# Patient Record
Sex: FEMALE | Race: WHITE | Employment: FULL TIME | URBAN - METROPOLITAN AREA
[De-identification: names, ages, dates, MRNs, and addresses within clinical notes are randomized per-mention and may not be internally consistent; named-entity substitution may affect disease eponyms.]

---

## 2019-05-09 ENCOUNTER — OFFICE VISIT (OUTPATIENT)
Dept: OBGYN CLINIC | Age: 59
End: 2019-05-09

## 2019-05-09 DIAGNOSIS — Z01.419 ENCOUNTER FOR GYNECOLOGICAL EXAMINATION WITHOUT ABNORMAL FINDING: Primary | ICD-10-CM

## 2019-05-09 NOTE — PROGRESS NOTES
Annual exam ages 40-58    [address] [place] [address] Naldo is a ,  62 y.o. female [facility] No LMP recorded. Patient is postmenopausal.    She presents for her annual checkup. She is having no significant problems. Sister  post L/S saima from atrial fib and clot. With regard to the Gardasil vaccine, she is older than the age for which it is FDA approved. Menstrual status:    Her periods are nonexistent in flow due to menopause. She denies dysmenorrhea. She reports no premenstrual symptoms. Contraception:    The current method of family planning is post menopausal status. Sexual history:    She  reports that she does not currently engage in sexual activity. Medical conditions:    Since her last annual GYN exam about two years ago, she has not the following changes in her health history: none. Pap and Mammogram History:    Her most recent Pap smear was normal, obtained 2 year(s) ago. The patient has not had a recent mammogram.    Breast Cancer History/Substance Abuse: negative    Osteoporosis History:    Family history does not include a first or second degree relative with osteopenia or osteoporosis. A bone density scan has not been obtained     Past Medical History:   Diagnosis Date    Dysmenorrhea     Fibrocystic breast     Graves disease     Hypercholesterolemia     Hypertension      Past Surgical History:   Procedure Laterality Date    HX OTHER SURGICAL      Eye Surgery    HX TONSILLECTOMY         Current Outpatient Medications   Medication Sig Dispense Refill    FOLBIC 2.5-25-2 mg tablet   11    DOCOSAHEXANOIC ACID/EPA (FISH OIL PO) Take  by mouth.  BIOTIN PO Take  by mouth.  cholecalciferol, vitamin D3, (VITAMIN D3) 2,000 unit tab Take  by mouth.  lutein 6 mg cap Take  by mouth.  ROSUVASTATIN CALCIUM (CRESTOR PO) Take  by mouth.  estradiol-norethindrone (COMBIPATCH) 0.05-0.14 mg/24 hr 1 Patch by TransDERmal route two (2) times a week.  24 Patch 3    estradiol (MINIVELLE) 0.075 mg/24 hr 1 Patch by TransDERmal route every Monday.  medroxyPROGESTERone (PROVERA) 10 mg tablet Take  by mouth daily.  LOSARTAN-HYDROCHLOROTHIAZIDE PO Take  by mouth.  OM-3/DHA/EPA/FISH OIL/VIT D3 (OMEGA-3 + VITAMIN D3 PO) Take  by mouth.  levothyroxine (SYNTHROID) 112 mcg tablet Take  by mouth Daily (before breakfast).  CYANOCOBALAMIN, VITAMIN B-12, (VITAMIN B-12 PO) Take  by mouth.  PYRIDOXINE HCL (VITAMIN B-6 PO) Take  by mouth. Allergies: Ciprofloxacin; Macrobid [nitrofurantoin monohyd/m-cryst]; and Penicillins     Tobacco History:  reports that she has been smoking. She has never used smokeless tobacco.  Alcohol Abuse:  has no alcohol history on file. Drug Abuse:  has no drug history on file. Family Medical/Cancer History:   Family History   Problem Relation Age of Onset    Heart Disease Mother     Celiac Disease Father         Review of Systems - History obtained from the patient  Constitutional: negative for weight loss, fever, night sweats  HEENT: negative for hearing loss, earache, congestion, snoring, sorethroat  CV: negative for chest pain, palpitations, edema  Resp: negative for cough, shortness of breath, wheezing  GI: negative for change in bowel habits, abdominal pain, black or bloody stools  : negative for frequency, dysuria, hematuria, vaginal discharge  MSK: negative for back pain, joint pain, muscle pain  Breast: negative for breast lumps, nipple discharge, galactorrhea  Skin :negative for itching, rash, hives  Neuro: negative for dizziness, headache, confusion, weakness  Psych: negative for anxiety, depression, change in mood  Heme/lymph: negative for bleeding, bruising, pallor    Physical Exam    There were no vitals taken for this visit.     Constitutional  · Appearance: well-nourished, well developed, alert, in no acute distress    HENT  · Head and Face: appears normal    Neck  · Inspection/Palpation: normal appearance, no masses or tenderness  · Lymph Nodes: no lymphadenopathy present  · Thyroid: gland size normal, nontender, no nodules or masses present on palpation    Chest  · Respiratory Effort: breathing unlabored  · Auscultation: normal breath sounds    Cardiovascular  · Heart:  · Auscultation: regular rate and rhythm without murmur    Breasts  · Inspection of Breasts: breasts symmetrical, no skin changes, no discharge present, nipple appearance normal, no skin retraction present  · Palpation of Breasts and Axillae: no masses present on palpation, no breast tenderness  · Axillary Lymph Nodes: no lymphadenopathy present    Gastrointestinal  · Abdominal Examination: abdomen non-tender to palpation, normal bowel sounds, no masses present  · Liver and spleen: no hepatomegaly present, spleen not palpable  · Hernias: no hernias identified    Genitourinary  · External Genitalia: normal appearance for age, no discharge present, no tenderness present, no inflammatory lesions present, no masses present, no atrophy present  · Vagina: normal vaginal vault without central or paravaginal defects, no discharge present, no inflammatory lesions present, no masses present  · Bladder: non-tender to palpation  · Urethra: appears normal  · Cervix: normal   · Uterus: normal size, shape and consistency  · Adnexa: no adnexal tenderness present, no adnexal masses present  · Perineum: perineum within normal limits, no evidence of trauma, no rashes or skin lesions present  · Anus: anus within normal limits, no hemorrhoids present  · Inguinal Lymph Nodes: no lymphadenopathy present    Skin  · General Inspection: no rash, no lesions identified    Neurologic/Psychiatric  · Mental Status:  · Orientation: grossly oriented to person, place and time  · Mood and Affect: mood normal, affect appropriate    Assessment:  Routine gynecologic examination  Her current medical status is satisfactory with no evidence of significant gynecologic issues.     Plan:  Counseled re: diet, exercise, healthy lifestyle  Return for yearly wellness visits  Rec annual mammogram

## 2019-05-09 NOTE — PATIENT INSTRUCTIONS
Well Visit, Women 48 to 72: Care Instructions  Your Care Instructions    Physical exams can help you stay healthy. Your doctor has checked your overall health and may have suggested ways to take good care of yourself. He or she also may have recommended tests. At home, you can help prevent illness with healthy eating, regular exercise, and other steps. Follow-up care is a key part of your treatment and safety. Be sure to make and go to all appointments, and call your doctor if you are having problems. It's also a good idea to know your test results and keep a list of the medicines you take. How can you care for yourself at home? · Reach and stay at a healthy weight. This will lower your risk for many problems, such as obesity, diabetes, heart disease, and high blood pressure. · Get at least 30 minutes of exercise on most days of the week. Walking is a good choice. You also may want to do other activities, such as running, swimming, cycling, or playing tennis or team sports. · Do not smoke. Smoking can make health problems worse. If you need help quitting, talk to your doctor about stop-smoking programs and medicines. These can increase your chances of quitting for good. · Protect your skin from too much sun. When you're outdoors from 10 a.m. to 4 p.m., stay in the shade or cover up with clothing and a hat with a wide brim. Wear sunglasses that block UV rays. Even when it's cloudy, put broad-spectrum sunscreen (SPF 30 or higher) on any exposed skin. · See a dentist one or two times a year for checkups and to have your teeth cleaned. · Wear a seat belt in the car. · Limit alcohol to 1 drink a day. Too much alcohol can cause health problems. Follow your doctor's advice about when to have certain tests. These tests can spot problems early. · Cholesterol.  Your doctor will tell you how often to have this done based on your age, family history, or other things that can increase your risk for heart attack and stroke. · Blood pressure. Have your blood pressure checked during a routine doctor visit. Your doctor will tell you how often to check your blood pressure based on your age, your blood pressure results, and other factors. · Mammogram. Ask your doctor how often you should have a mammogram, which is an X-ray of your breasts. A mammogram can spot breast cancer before it can be felt and when it is easiest to treat. · Pap test and pelvic exam. Ask your doctor how often you should have a Pap test. You may not need to have a Pap test as often as you used to. · Vision. Have your eyes checked every year or two or as often as your doctor suggests. Some experts recommend that you have yearly exams for glaucoma and other age-related eye problems starting at age 48. · Hearing. Tell your doctor if you notice any change in your hearing. You can have tests to find out how well you hear. · Diabetes. Ask your doctor whether you should have tests for diabetes. · Colon cancer. You should begin tests for colon cancer at age 48. You may have one of several tests. Your doctor will tell you how often to have tests based on your age and risk. Risks include whether you already had a precancerous polyp removed from your colon or whether your parents, sisters and brothers, or children have had colon cancer. · Thyroid disease. Talk to your doctor about whether to have your thyroid checked as part of a regular physical exam. Women have an increased chance of a thyroid problem. · Osteoporosis. You should begin tests for bone density at age 72. If you are younger than 72, ask your doctor whether you have factors that may increase your risk for this disease. You may want to have this test before age 72. · Heart attack and stroke risk. At least every 4 to 6 years, you should have your risk for heart attack and stroke assessed.  Your doctor uses factors such as your age, blood pressure, cholesterol, and whether you smoke or have diabetes to show what your risk for a heart attack or stroke is over the next 10 years. When should you call for help? Watch closely for changes in your health, and be sure to contact your doctor if you have any problems or symptoms that concern you. Where can you learn more? Go to http://jason-deanna.info/. Enter J182 in the search box to learn more about \"Well Visit, Women 50 to 72: Care Instructions. \"  Current as of: March 28, 2018  Content Version: 11.9  © 3782-8667 [employer]. Care instructions adapted under license by [employer] (which disclaims liability or warranty for this information). If you have questions about a medical condition or this instruction, always ask your healthcare professional. [address] any warranty or liability for your use of this information.

## 2022-05-26 NOTE — PROGRESS NOTES
Annual exam ages 40-58      [facility] Naldo is a [de-identified] P5,  64 y.o. female   No LMP recorded. Patient is postmenopausal.    She presents for her annual checkup. Swimming and Pilates for exercise. Has met someone that she would like to have sex with. She is having no significant problems. With regard to the Gardasil vaccine, she is older than the FDA approved age to receive it. Menstrual status:    Her periods are absent in flow. Contraception:    The current method of family planning is NA post menopause. Hormonal status:  She reports no perimenstrual type symptoms. She is not having vasomotor symptoms. The patient is not using any ERT. Sexual history:    She  reports previously being sexually active. Medical conditions:    Since her last annual GYN exam about three years ago, she has not the following changes in her health history: none. Surgical history confirmed with patient. has a past surgical history that includes hx tonsillectomy and hx other surgical.    Pap and Mammogram History:    Her most recent Pap smear was normal, obtained 5 year(s) ago. The patient has not had a recent mammogram.    Breast Cancer History/Substance Abuse: negative      Osteoporosis History:    Family history does not include a first or second degree relative with osteopenia or osteoporosis. Past Medical History:   Diagnosis Date    Dysmenorrhea     Fibrocystic breast     Graves disease     Hypercholesterolemia     Hypertension      Past Surgical History:   Procedure Laterality Date    HX OTHER SURGICAL      Eye Surgery    HX TONSILLECTOMY         Current Outpatient Medications   Medication Sig Dispense Refill    FOLBIC 2.5-25-2 mg tablet   11    DOCOSAHEXANOIC ACID/EPA (FISH OIL PO) Take  by mouth.  BIOTIN PO Take  by mouth.  cholecalciferol, vitamin D3, (VITAMIN D3) 2,000 unit tab Take  by mouth.  lutein 6 mg cap Take  by mouth.       ROSUVASTATIN CALCIUM (CRESTOR PO) Take by mouth.  LOSARTAN-HYDROCHLOROTHIAZIDE PO Take  by mouth.  OM-3/DHA/EPA/FISH OIL/VIT D3 (OMEGA-3 + VITAMIN D3 PO) Take  by mouth.  levothyroxine (SYNTHROID) 112 mcg tablet Take  by mouth Daily (before breakfast).  CYANOCOBALAMIN, VITAMIN B-12, (VITAMIN B-12 PO) Take  by mouth.  PYRIDOXINE HCL (VITAMIN B-6 PO) Take  by mouth.  estradiol-norethindrone (COMBIPATCH) 0.05-0.14 mg/24 hr 1 Patch by TransDERmal route two (2) times a week. (Patient not taking: Reported on 5/27/2022) 24 Patch 3    estradiol (MINIVELLE) 0.075 mg/24 hr 1 Patch by TransDERmal route every Monday. (Patient not taking: Reported on 5/27/2022)      medroxyPROGESTERone (PROVERA) 10 mg tablet Take  by mouth daily. (Patient not taking: Reported on 5/27/2022)       Allergies: Ciprofloxacin, Macrobid [nitrofurantoin monohyd/m-cryst], and Penicillins     Tobacco History:  reports that she has been smoking. She has never used smokeless tobacco.  Alcohol Abuse:  has no history on file for alcohol use. Drug Abuse:  has no history on file for drug use.     Family Medical/Cancer History:   Family History   Problem Relation Age of Onset    Heart Disease Mother     Celiac Disease Father         Review of Systems - History obtained from the patient  Constitutional: negative for weight loss, fever, night sweats  HEENT: negative for hearing loss, earache, congestion, snoring, sorethroat  CV: negative for chest pain, palpitations, edema  Resp: negative for cough, shortness of breath, wheezing  GI: negative for change in bowel habits, abdominal pain, black or bloody stools  : negative for frequency, dysuria, hematuria, vaginal discharge  MSK: negative for back pain, joint pain, muscle pain  Breast: negative for breast lumps, nipple discharge, galactorrhea  Skin :negative for itching, rash, hives  Neuro: negative for dizziness, headache, confusion, weakness  Psych: negative for anxiety, depression, change in mood  Heme/lymph: negative for bleeding, bruising, pallor    Physical Exam    Visit Vitals  /72   Wt 164 lb 3.2 oz (74.5 kg)   BMI 25.72 kg/m²       Constitutional  · Appearance: well-nourished, well developed, alert, in no acute distress    HENT  · Head and Face: appears normal    Neck  · Inspection/Palpation: normal appearance, no masses or tenderness  · Lymph Nodes: no lymphadenopathy present  · Thyroid: gland size normal, nontender, no nodules or masses present on palpation    Chest  · Respiratory Effort: breathing unlabored  · Auscultation: normal breath sounds    Cardiovascular  · Heart:  · Auscultation: regular rate and rhythm without murmur    Breasts  · Inspection of Breasts: breasts symmetrical, no skin changes, no discharge present, nipple appearance normal, no skin retraction present  · Palpation of Breasts and Axillae: no masses present on palpation, no breast tenderness  · Axillary Lymph Nodes: no lymphadenopathy present    Gastrointestinal  · Abdominal Examination: abdomen non-tender to palpation, normal bowel sounds, no masses present  · Liver and spleen: no hepatomegaly present, spleen not palpable  · Hernias: no hernias identified    Genitourinary  · External Genitalia: normal appearance for age, no discharge present, no tenderness present, no inflammatory lesions present, no masses present, no atrophy present  · Vagina: normal vaginal vault without central or paravaginal defects, no discharge present, no inflammatory lesions present, no masses present  · Bladder: non-tender to palpation  · Urethra: appears normal  · Cervix: normal   · Uterus: normal size, shape and consistency  · Adnexa: no adnexal tenderness present, no adnexal masses present  · Perineum: perineum within normal limits, no evidence of trauma, no rashes or skin lesions present  · Anus: anus within normal limits, no hemorrhoids present  · Inguinal Lymph Nodes: no lymphadenopathy present    Skin  · General Inspection: no rash, no lesions identified    Neurologic/Psychiatric  · Mental Status:  · Orientation: grossly oriented to person, place and time  · Mood and Affect: mood normal, affect appropriate    Assessment:  Routine gynecologic examination  Her current medical status is satisfactory with no evidence of significant gynecologic issues. Discussed lubrication and re-starting IC.     Plan:  Counseled re: diet, exercise, healthy lifestyle  Return for yearly wellness visits  Rec annual mammogram

## 2022-05-27 ENCOUNTER — OFFICE VISIT (OUTPATIENT)
Dept: OBGYN CLINIC | Age: 62
End: 2022-05-27
Payer: COMMERCIAL

## 2022-05-27 VITALS — WEIGHT: 164.2 LBS | BODY MASS INDEX: 25.72 KG/M2 | SYSTOLIC BLOOD PRESSURE: 128 MMHG | DIASTOLIC BLOOD PRESSURE: 72 MMHG

## 2022-05-27 DIAGNOSIS — Z12.31 ENCOUNTER FOR SCREENING MAMMOGRAM FOR BREAST CANCER: Primary | ICD-10-CM

## 2022-05-27 PROCEDURE — 99396 PREV VISIT EST AGE 40-64: CPT | Performed by: OBSTETRICS & GYNECOLOGY

## 2023-03-28 ENCOUNTER — TELEPHONE (OUTPATIENT)
Dept: OBGYN CLINIC | Age: 63
End: 2023-03-28

## 2023-03-28 DIAGNOSIS — Z12.39 SCREENING BREAST EXAMINATION: Primary | ICD-10-CM

## 2023-03-28 NOTE — TELEPHONE ENCOUNTER
Two patient identifiers used    58year old patient last seen in the office on 5/27/2022 for ae     Patient calling to get order for screening mammogram to be faxed to [facility]    Order placed as per MD order and faxed to [facility] at [phone] with confirmation received    Patient wondering who MD would refer for colonoscopy  Please advise    Thank you

## 2023-03-29 NOTE — TELEPHONE ENCOUNTER
Patient advised of MD recommendations and was reschedule per patient request for 6/1/2023 at 1:00 Pm for ae      Patient verbalized understanding.

## 2023-05-17 RX ORDER — MEDROXYPROGESTERONE ACETATE 10 MG/1
TABLET ORAL DAILY
COMMUNITY
End: 2023-06-01

## 2023-05-17 RX ORDER — HYDROCODONE/ACETAMINOPHEN 5 MG-500MG
TABLET ORAL
COMMUNITY
End: 2023-06-01

## 2023-05-17 RX ORDER — ESTRADIOL 0.07 MG/D
1 FILM, EXTENDED RELEASE TRANSDERMAL
COMMUNITY
End: 2023-06-01

## 2023-05-17 RX ORDER — LEVOTHYROXINE SODIUM 112 UG/1
TABLET ORAL
COMMUNITY
End: 2023-06-01

## 2023-05-17 RX ORDER — FOLIC ACID-PYRIDOXINE-CYANOCOBALAMIN TAB 2.5-25-2 MG 2.5-25-2 MG
TAB ORAL
COMMUNITY
Start: 2015-09-07

## 2023-06-01 ENCOUNTER — OFFICE VISIT (OUTPATIENT)
Age: 63
End: 2023-06-01
Payer: COMMERCIAL

## 2023-06-01 VITALS — SYSTOLIC BLOOD PRESSURE: 145 MMHG | WEIGHT: 180.2 LBS | DIASTOLIC BLOOD PRESSURE: 76 MMHG

## 2023-06-01 DIAGNOSIS — Z01.419 ENCOUNTER FOR ANNUAL ROUTINE GYNECOLOGICAL EXAMINATION: Primary | ICD-10-CM

## 2023-06-01 DIAGNOSIS — Z12.4 ENCOUNTER FOR PAPANICOLAOU SMEAR FOR CERVICAL CANCER SCREENING: ICD-10-CM

## 2023-06-01 PROCEDURE — 99396 PREV VISIT EST AGE 40-64: CPT | Performed by: OBSTETRICS & GYNECOLOGY

## 2023-06-01 RX ORDER — LOSARTAN POTASSIUM 100 MG/1
TABLET ORAL
COMMUNITY
Start: 2023-05-27

## 2023-06-01 RX ORDER — ROSUVASTATIN CALCIUM 5 MG/1
2.5 TABLET, COATED ORAL
COMMUNITY

## 2023-06-01 RX ORDER — LEVOTHYROXINE SODIUM 112 UG/1
112 TABLET ORAL DAILY
COMMUNITY

## 2023-06-01 NOTE — PROGRESS NOTES
Luis A Sierra is a 58 y.o. female returns for an annual exam     Chief Complaint   Patient presents with    Annual Exam       No LMP recorded. (Menstrual status: Menopause). Her periods are absent in flow   Problems: no problems  Birth Control: post menopausal status. Last Pap: normal obtained 7 year(s) ago. She does not have a history of MACRINA 2, 3 or cervical cancer. Last Mammogram: done 4/4/23          1. Have you been to the ER, urgent care clinic, or hospitalized since your last visit? No    2. Have you seen or consulted any other health care providers outside of the [address] [place] [address] since your last visit? No    Examination chaperoned by Eleazar Goss LPN.

## 2023-06-01 NOTE — PROGRESS NOTES
Annual exam post menopause      Peter Taylor is a No obstetric history on file. ,  58 y.o. female   No LMP recorded. (Menstrual status: Menopause). She presents for her annual checkup. She is having no problems. Menstrual status: The patient is not having menses. Hormonal status:  She reports no perimenstrual type symptoms. She is not having vasomotor symptoms. The patient is not using any HRT. Did not start it. Sexual history:    She  has no history on file for sexual activity. Per Nursing Note:  Last Pap: normal obtained 7 year(s) ago. She does not have a history of MACRINA 2, 3 or cervical cancer.       Last Mammogram: done 4/4/23    Past Medical History:   Diagnosis Date    Dysmenorrhea     Fibrocystic breast     Graves disease     Hypercholesterolemia     Hypertension      Past Surgical History:   Procedure Laterality Date    OTHER SURGICAL HISTORY      Eye Surgery    TONSILLECTOMY         Current Outpatient Medications   Medication Sig Dispense Refill    levothyroxine (SYNTHROID) 112 MCG tablet Take 1 tablet by mouth Daily      rosuvastatin (CRESTOR) 5 MG tablet 0.5 tablets      losartan (COZAAR) 100 MG tablet       BIOTIN PO Take by mouth      Cholecalciferol 50 MCG (2000 UT) TABS Take by mouth      estradiol-norethindrone (COMBIPATCH) 0.05-0.14 MG/DAY Place 1 patch onto the skin Twice a Week      folic acid-pyridoxine-cyancobalamin 2.5-25-2 mg tablet (FOLBIC) 2.5-25-2 MG TABS ceived the following from [facility]: Outside name: FOLBIC 2.5-25-2 mg tablet      estradiol (VIVELLE) 0.075 MG/24HR Place 1 patch onto the skin (Patient not taking: Reported on 6/1/2023)      levothyroxine (SYNTHROID) 112 MCG tablet Take by mouth every morning (before breakfast) (Patient not taking: Reported on 6/1/2023)      Lutein 6 MG CAPS Take by mouth (Patient not taking: Reported on 6/1/2023)      medroxyPROGESTERone (PROVERA) 10 MG tablet Take by mouth daily (Patient not taking: Reported on

## 2023-06-08 LAB
CYTOLOGIST CVX/VAG CYTO: NORMAL
CYTOLOGY CVX/VAG DOC CYTO: NORMAL
CYTOLOGY CVX/VAG DOC THIN PREP: NORMAL
DX ICD CODE: NORMAL
HPV GENOTYPE REFLEX: NORMAL
HPV I/H RISK 4 DNA CVX QL PROBE+SIG AMP: NEGATIVE
Lab: NORMAL
OTHER STN SPEC: NORMAL
STAT OF ADQ CVX/VAG CYTO-IMP: NORMAL

## 2023-06-23 ENCOUNTER — TELEPHONE (OUTPATIENT)
Age: 63
End: 2023-06-23

## 2023-06-23 NOTE — TELEPHONE ENCOUNTER
Two patient identifiers used    58year old patient last seen in the office on 6/1/2023 and is calling to check on her recent pap smear results    Patient advised of MD reviewed lab results and recommendations    Patient was provided the my chart help desk phone number to set up her my chart    Patient verbalized understanding.

## 2023-11-08 ENCOUNTER — TELEPHONE (OUTPATIENT)
Age: 63
End: 2023-11-08

## 2023-11-08 ENCOUNTER — OFFICE VISIT (OUTPATIENT)
Age: 63
End: 2023-11-08
Payer: COMMERCIAL

## 2023-11-08 VITALS — WEIGHT: 181.4 LBS | SYSTOLIC BLOOD PRESSURE: 169 MMHG | DIASTOLIC BLOOD PRESSURE: 84 MMHG

## 2023-11-08 DIAGNOSIS — N90.4 LICHEN SCLEROSUS OF VULVA: Primary | ICD-10-CM

## 2023-11-08 PROCEDURE — 99213 OFFICE O/P EST LOW 20 MIN: CPT | Performed by: OBSTETRICS & GYNECOLOGY

## 2023-11-08 NOTE — TELEPHONE ENCOUNTER
Two patient identifiers used    61year old patient last seen in the office on 6/1/2023 for ae    Patient calling to complain about vaginal itching for a few days , no discharge and has not tried anything over the counter      Patient was placed on the schedule to be seen to day as per her request at 1:20PM    Patient was advised that she will be evaluated for the vaginal itching at this visit    Patient verbalized understanding    AS aware of added on appointment

## 2023-11-08 NOTE — PROGRESS NOTES
Vulvar lesion evaluation    Nik Guzmán is a 61 y.o. female  No LMP recorded. (Menstrual status: Menopause). who presents with vulvar irritation. She noticed it several months ago. The sensation has not changed. No discharge or recent antibiotics. She reports the following associated symptoms: hemorrhoid itching. She denies the following associated symptoms: redness, drainage, swelling, irritation. Aggravating factors: none. Alleviating factors: none. Past Medical History:   Diagnosis Date    Dysmenorrhea     Fibrocystic breast     Graves disease     Hypercholesterolemia     Hypertension      Past Surgical History:   Procedure Laterality Date    OTHER SURGICAL HISTORY      Eye Surgery    TONSILLECTOMY       Social History     Occupational History    Not on file   Tobacco Use    Smoking status: Some Days    Smokeless tobacco: Never   Substance and Sexual Activity    Alcohol use: Not on file    Drug use: Not on file    Sexual activity: Not on file     Family History   Problem Relation Age of Onset    Celiac Disease Father     Heart Disease Mother        Allergies   Allergen Reactions    Ciprofloxacin      Other reaction(s): Unknown (comments)    Nitrofurantoin      Other reaction(s): Unknown (comments)    Penicillins      Other reaction(s): Unknown (comments)     Prior to Admission medications    Medication Sig Start Date End Date Taking? Authorizing Provider   betamethasone valerate (VALISONE) 0.1 % cream Apply topically 2 times daily.  11/8/23 11/15/23 Yes Alethea Coates MD   levothyroxine (SYNTHROID) 112 MCG tablet Take 1 tablet by mouth Daily    ProviderAnna MD   rosuvastatin (CRESTOR) 5 MG tablet 0.5 tablets    Anna Munoz MD   losartan (COZAAR) 100 MG tablet  5/27/23   ProviderAnna MD   BIOTIN PO Take by mouth    Automatic Reconciliation, Ar   Cholecalciferol 50 MCG (2000 UT) TABS Take by mouth    Automatic Reconciliation, Ar   folic acid-pyridoxine-cyancobalamin

## 2023-11-08 NOTE — PROGRESS NOTES
Fan Jimenez is a 61 y.o. female presents for a problem visit. Chief Complaint   Patient presents with    Vaginal Itching     No LMP recorded. (Menstrual status: Menopause). Birth Control: none. Last Pap: normal obtained 5 months  ago. The patient is reporting having:  vaginal itching   for 3 days. She reports the symptoms are is unchanged. Aggravating factors include none. And alleviating factors include none. 1. Have you been to the ER, urgent care clinic, or hospitalized since your last visit? No    2. Have you seen or consulted any other health care providers outside of the [address] [place] [address] Avenue since your last visit? No    Examination chaperoned by Venkat Pearson LPN.

## 2025-05-08 NOTE — PROGRESS NOTES
Ellen Do is a 64 y.o. female returns for an annual exam     No LMP recorded. (Menstrual status: Menopause).  Problems: no problems  Birth Control: post menopausal status.  Last Pap: normal obtained 2 year(s) ago.  She does not have a history of MACRINA 2, 3 or cervical cancer.   Last Mammogram: has not had a recent mammogram.  It was normal in 2023.   Last Bone Density: Never   Last colonoscopy: 2024 WNL      1. Have you been to the ER, urgent care clinic, or hospitalized since your last visit? No    2. Have you seen or consulted any other health care providers outside of the [facility] since your last visit? No    Examination chaperoned by Moustapha Wu LPN.

## 2025-05-13 ENCOUNTER — OFFICE VISIT (OUTPATIENT)
Age: 65
End: 2025-05-13
Payer: COMMERCIAL

## 2025-05-13 VITALS
DIASTOLIC BLOOD PRESSURE: 79 MMHG | OXYGEN SATURATION: 97 % | HEIGHT: 67 IN | SYSTOLIC BLOOD PRESSURE: 171 MMHG | BODY MASS INDEX: 28.28 KG/M2 | HEART RATE: 67 BPM | WEIGHT: 180.2 LBS

## 2025-05-13 DIAGNOSIS — Z01.419 ENCOUNTER FOR ANNUAL ROUTINE GYNECOLOGICAL EXAMINATION: Primary | ICD-10-CM

## 2025-05-13 DIAGNOSIS — N90.4 LICHEN SCLEROSUS OF VULVA: ICD-10-CM

## 2025-05-13 PROCEDURE — 99396 PREV VISIT EST AGE 40-64: CPT | Performed by: OBSTETRICS & GYNECOLOGY

## 2025-05-13 RX ORDER — BETAMETHASONE VALERATE 1.2 MG/G
CREAM TOPICAL
Qty: 45 G | Refills: 4 | Status: SHIPPED | OUTPATIENT
Start: 2025-05-13 | End: 2025-05-20

## 2025-05-13 SDOH — ECONOMIC STABILITY: FOOD INSECURITY: WITHIN THE PAST 12 MONTHS, YOU WORRIED THAT YOUR FOOD WOULD RUN OUT BEFORE YOU GOT MONEY TO BUY MORE.: NEVER TRUE

## 2025-05-13 SDOH — ECONOMIC STABILITY: FOOD INSECURITY: WITHIN THE PAST 12 MONTHS, THE FOOD YOU BOUGHT JUST DIDN'T LAST AND YOU DIDN'T HAVE MONEY TO GET MORE.: NEVER TRUE

## 2025-05-13 ASSESSMENT — PATIENT HEALTH QUESTIONNAIRE - PHQ9
SUM OF ALL RESPONSES TO PHQ QUESTIONS 1-9: 0
1. LITTLE INTEREST OR PLEASURE IN DOING THINGS: NOT AT ALL
2. FEELING DOWN, DEPRESSED OR HOPELESS: NOT AT ALL
SUM OF ALL RESPONSES TO PHQ QUESTIONS 1-9: 0

## 2025-05-13 NOTE — PROGRESS NOTES
Annual exam post menopause      Ellen Do is a No obstetric history on file.,  64 y.o. female   No LMP recorded. (Menstrual status: Menopause).    She presents for her annual checkup.     She is having no problems.    Menstrual status:  The patient is not having menses.    Hormonal status:  She reports no perimenstrual type symptoms.   She is not having vasomotor symptoms.  The patient is not using any HRT.     Sexual history:    She  has no history on file for sexual activity.    Per Nursing Note:  Last Pap: normal obtained 2 year(s) ago.  She does not have a history of MACRINA 2, 3 or cervical cancer.   Last Mammogram: has not had a recent mammogram.  It was normal in 2023.   Last Bone Density: Never   Last colonoscopy: 2024 WNL    Past Medical History:   Diagnosis Date    Dysmenorrhea     Fibrocystic breast     Graves disease     Hypercholesterolemia     Hypertension      Past Surgical History:   Procedure Laterality Date    OTHER SURGICAL HISTORY      Eye Surgery    TONSILLECTOMY         Current Outpatient Medications   Medication Sig Dispense Refill    levothyroxine (SYNTHROID) 112 MCG tablet Take 1 tablet by mouth Daily      rosuvastatin (CRESTOR) 5 MG tablet 0.5 tablets      losartan (COZAAR) 100 MG tablet       Cholecalciferol 50 MCG (2000 UT) TABS Take by mouth      folic acid-pyridoxine-cyancobalamin 2.5-25-2 mg tablet (FOLBIC) 2.5-25-2 MG TABS ceived the following from [facility]: Outside name: FOLBIC 2.5-25-2 mg tablet       No current facility-administered medications for this visit.     Allergies: Ciprofloxacin, Nitrofurantoin, and Penicillins     Tobacco History:  reports that she has quit smoking. Her smoking use included cigarettes. She has been exposed to tobacco smoke. She has never used smokeless tobacco.  Alcohol Abuse:  has no history on file for alcohol use.  Drug Abuse:  has no history on file for drug use.    Family Medical/Cancer History:   Family History   Problem